# Patient Record
Sex: FEMALE | URBAN - METROPOLITAN AREA
[De-identification: names, ages, dates, MRNs, and addresses within clinical notes are randomized per-mention and may not be internally consistent; named-entity substitution may affect disease eponyms.]

---

## 2022-05-10 ENCOUNTER — OFFICE VISIT (OUTPATIENT)
Dept: URBAN - METROPOLITAN AREA CLINIC 5 | Facility: CLINIC | Age: 42
End: 2022-05-10
Payer: COMMERCIAL

## 2022-05-10 DIAGNOSIS — H25.043 POSTERIOR SUBCAPSULAR POLAR AGE-RELATED CATARACT, BILATERAL: Primary | ICD-10-CM

## 2022-05-10 DIAGNOSIS — E11.3553 DIABETES MELLITUS TYPE 2 WITH STABLE PROLIFERATIVE RETINOPATHY, BILATERAL: ICD-10-CM

## 2022-05-10 DIAGNOSIS — H33.053 TOTAL RETINAL DETACHMENT, BILATERAL: ICD-10-CM

## 2022-05-10 PROCEDURE — 99204 OFFICE O/P NEW MOD 45 MIN: CPT | Performed by: OPTOMETRIST

## 2022-05-10 ASSESSMENT — INTRAOCULAR PRESSURE
OD: 25
OS: 24

## 2022-05-10 NOTE — IMPRESSION/PLAN
Impression: Total retinal detachment, bilateral: H33.053. Plan: Hx of RD repair with Dr Morales Pereira. Silicone oil OS. Referral to [facility] for possible treatment and clearance for CE.

## 2022-05-10 NOTE — IMPRESSION/PLAN
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 Plan: Cataracts both eyes (H25.813) - plan to perform cataract surgery in both eyes with the right eye first: Discussed the risks, benefits, and alternatives of cataract surgery. Given that we almost never use retrobulbar anesthesia, there is typically no need to stop any anticoagulant medications when a patient gets cataract surgery with us. In most cataract surgeries performed by us we place an antibiotic and steroid at the time of surgery so most likely will not need to use any prescription post-op drops. The patient stated a full understanding and a desire to proceed with the procedure. Biometry ordered for intraocular lens selection. Advised against driving until complete. Reviewed the increased risk of retinal detachment after cataract surgery and reviewed retinal detachment and general warnings and to contact us immediately should any of those develop. HOLD UNTIL CLEARED BY RETINA SPECIALIST. Silicone oil OS. Surgical recommendations: No contraindications for toric.

## 2022-05-10 NOTE — IMPRESSION/PLAN
Impression: Diabetes mellitus Type 2 with stable proliferative retinopathy, bilateral: W01.9451. Plan: Hx of laser with Dr Kathie Frausto both eyes. Limited view today. Will refer patient for a retinal consult to determine if treatment is necessary. Discussed ocular and systemic benefits of maintaining blood sugar control.

## 2022-07-13 ENCOUNTER — TESTING ONLY (OUTPATIENT)
Dept: URBAN - METROPOLITAN AREA CLINIC 6 | Facility: CLINIC | Age: 42
End: 2022-07-13
Payer: COMMERCIAL

## 2022-07-13 DIAGNOSIS — H25.043 POSTERIOR SUBCAPSULAR POLAR AGE-RELATED CATARACT, BILATERAL: Primary | ICD-10-CM

## 2022-07-25 ENCOUNTER — Encounter (OUTPATIENT)
Dept: URBAN - METROPOLITAN AREA SURGERY 2 | Facility: SURGERY | Age: 42
End: 2022-07-25
Payer: COMMERCIAL

## 2022-07-25 PROCEDURE — 66984 XCAPSL CTRC RMVL W/O ECP: CPT | Performed by: OPHTHALMOLOGY

## 2022-07-26 ENCOUNTER — PROCEDURE (OUTPATIENT)
Dept: URBAN - METROPOLITAN AREA SURGERY 1 | Facility: SURGERY | Age: 42
End: 2022-07-26
Payer: COMMERCIAL

## 2022-07-26 DIAGNOSIS — H25.813 COMBINED FORMS OF AGE-RELATED CATARACT, BILATERAL: Primary | ICD-10-CM

## 2022-07-27 ENCOUNTER — POST-OPERATIVE VISIT (OUTPATIENT)
Dept: URBAN - METROPOLITAN AREA CLINIC 6 | Facility: CLINIC | Age: 42
End: 2022-07-27
Payer: COMMERCIAL

## 2022-07-27 DIAGNOSIS — Z48.810 ENCOUNTER FOR SURGICAL AFTERCARE FOLLOWING SURGERY ON A SENSE ORGAN: Primary | ICD-10-CM

## 2022-07-27 PROCEDURE — 99024 POSTOP FOLLOW-UP VISIT: CPT | Performed by: OPTOMETRIST

## 2022-07-27 ASSESSMENT — INTRAOCULAR PRESSURE: OD: 20

## 2022-07-27 NOTE — IMPRESSION/PLAN
Impression: S/P Cataract Extraction by phacoemulsification with IOL placement OD - 1 Day. Encounter for surgical aftercare following surgery on a sense organ  Z48.810. Plan: S/P Cataract extraction right eye with placement of intraocular Dexycu and moxifloxacin intracamerally - post-op day #1 - Small abrasion at superior incision- Continue Erythromycin QID OD. Advised no heavy lifting or strenuous activity for at least one week and no swimming for at least three weeks. Use eye shield at night for one week. Patient advised to call immediately for any problems including, but not limited to, pain, redness, increase in floaters, flashing lights, changes in peripheral vision, decreased vision, and/or any other problems or concerns. Patient stated an understanding of all the instructions. --Advised patient to use artificial tears for comfort.

## 2022-08-08 ENCOUNTER — Encounter (OUTPATIENT)
Dept: URBAN - METROPOLITAN AREA SURGERY 2 | Facility: SURGERY | Age: 42
End: 2022-08-08
Payer: COMMERCIAL

## 2022-08-08 PROCEDURE — 66984 XCAPSL CTRC RMVL W/O ECP: CPT | Performed by: OPHTHALMOLOGY

## 2022-08-09 ENCOUNTER — PROCEDURE (OUTPATIENT)
Dept: URBAN - METROPOLITAN AREA SURGERY 1 | Facility: SURGERY | Age: 42
End: 2022-08-09
Payer: COMMERCIAL

## 2022-08-10 ENCOUNTER — POST-OPERATIVE VISIT (OUTPATIENT)
Dept: URBAN - METROPOLITAN AREA CLINIC 6 | Facility: CLINIC | Age: 42
End: 2022-08-10
Payer: COMMERCIAL

## 2022-08-10 DIAGNOSIS — Z96.1 PRESENCE OF INTRAOCULAR LENS: Primary | ICD-10-CM

## 2022-08-10 PROCEDURE — 99024 POSTOP FOLLOW-UP VISIT: CPT | Performed by: OPTOMETRIST

## 2022-08-10 ASSESSMENT — INTRAOCULAR PRESSURE
OD: 21
OS: 15

## 2022-08-10 NOTE — IMPRESSION/PLAN
Impression: S/P Cataract Extraction by phacoemulsification with IOL placement OS - 1 Day. Presence of intraocular lens  Z96.1. Plan: S/P Cataract extraction left eye - post-op day #1 - Use Moxifloxacin and Prednisolone QID OD. Advised no heavy lifting or strenuous activity for one week. Advised no swimming for three weeks. Use eye shield at night for the first week. Patient advised to call immediately for any problems including, but not limited to, pain, redness, and decreased vision. Advised to bring eye drops to each visit. Patient stated an understanding of all the instructions.

## 2022-08-18 ENCOUNTER — POST-OPERATIVE VISIT (OUTPATIENT)
Dept: URBAN - METROPOLITAN AREA CLINIC 6 | Facility: CLINIC | Age: 42
End: 2022-08-18
Payer: COMMERCIAL

## 2022-08-18 DIAGNOSIS — Z96.1 PRESENCE OF INTRAOCULAR LENS: Primary | ICD-10-CM

## 2022-08-18 PROCEDURE — 99024 POSTOP FOLLOW-UP VISIT: CPT | Performed by: OPTOMETRIST

## 2022-08-18 ASSESSMENT — INTRAOCULAR PRESSURE
OD: 24
OS: 18

## 2022-08-18 NOTE — IMPRESSION/PLAN
Impression: S/P Cataract Extraction by phacoemulsification with IOL placement OD - 23 Days. Presence of intraocular lens  Z96.1. Plan: NOTE: Next appt with Dr Mary Orantes 9/15/22 Healing well. Start taper for prednisolone OS TID for one week, BID for one week, QD for one week, then stop. Discontinue Moxifloxacin. Advised patient to avoid swimming for at least 2 more weeks. Advised to call immediately for any problems or concerns including, but not limited to, pain, redness, or decreased vision. The patient stated an understanding of the above. 

Referral to [facility] for ASAP eval for retina - see attached referral note

## 2022-11-22 ENCOUNTER — OFFICE VISIT (OUTPATIENT)
Dept: URBAN - METROPOLITAN AREA CLINIC 6 | Facility: CLINIC | Age: 42
End: 2022-11-22
Payer: COMMERCIAL

## 2022-11-22 DIAGNOSIS — H26.493 OTHER SECONDARY CATARACT, BILATERAL: ICD-10-CM

## 2022-11-22 DIAGNOSIS — H35.371 PUCKERING OF MACULA, RIGHT EYE: ICD-10-CM

## 2022-11-22 DIAGNOSIS — E11.3553 DIABETES MELLITUS TYPE 2 WITH STABLE PROLIFERATIVE RETINOPATHY, BILATERAL: Primary | ICD-10-CM

## 2022-11-22 DIAGNOSIS — H31.012 MACULA SCARS OF POSTERIOR POLE (POST TRAUMATIC), LEFT EYE: ICD-10-CM

## 2022-11-22 PROCEDURE — 92014 COMPRE OPH EXAM EST PT 1/>: CPT | Performed by: OPTOMETRIST

## 2022-11-22 ASSESSMENT — INTRAOCULAR PRESSURE
OS: 23
OD: 24

## 2022-11-22 NOTE — IMPRESSION/PLAN
Impression: Puckering of macula, right eye: H35.371. Plan: Epiretinal membrane right eye - monitor with Dr. Eva Watts.

## 2022-11-22 NOTE — IMPRESSION/PLAN
Impression: Diabetes mellitus Type 2 with stable proliferative retinopathy, bilateral: S10.3187. Plan: Diabetes Mellitus Type II with Proliferative Diabetic Retinopathy both eyes /VH OS/silicon oil OS-under the care of Dr. Monika Puentes. Advised to sleep with head elevation. Discussed the pathophysiology of diabetes and its effect on the eye. Stressed the importance of strong glucose control. Advised of importance of scheduled dilated examinations, and to contact us immediately for any problems or concerns.

## 2022-11-22 NOTE — IMPRESSION/PLAN
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 Plan: Macular Scar - Monitor.

## 2024-08-12 NOTE — IMPRESSION/PLAN
"Subjective:      Patient ID: Natacha Bacon is a 65 y.o. female.    Vitals:  height is 5' 4" (1.626 m) and weight is 69.4 kg (153 lb). Her oral temperature is 99 °F (37.2 °C). Her blood pressure is 117/79 and her pulse is 72. Her respiration is 16 and oxygen saturation is 96%.     Chief Complaint: Rash    Pt presents today with a rash after being treated for poison ivy two weeks ago. Pt states the antibiotics helped remove the blisters however now she is breaking out all over her body.     Rash  This is a new problem. The current episode started 1 to 4 weeks ago. The problem has been gradually worsening since onset. The rash is diffuse. The rash is characterized by itchiness and redness. She was exposed to nothing. Pertinent negatives include no anorexia, congestion, cough, diarrhea, eye pain, facial edema, fatigue, fever, joint pain, nail changes, rhinorrhea, shortness of breath, sore throat or vomiting. Past treatments include antibiotics. The treatment provided mild relief. Her past medical history is significant for varicella. There is no history of allergies, asthma or eczema.       Constitution: Negative for fatigue and fever.   HENT:  Negative for congestion and sore throat.    Eyes:  Negative for eye pain.   Respiratory:  Negative for cough and shortness of breath.    Gastrointestinal:  Negative for vomiting and diarrhea.   Skin:  Positive for rash. Negative for erythema.      Objective:     Physical Exam   Constitutional: She is oriented to person, place, and time. She appears well-developed.   HENT:   Head: Normocephalic and atraumatic. Head is without abrasion, without contusion and without laceration.   Ears:   Right Ear: External ear normal.   Left Ear: External ear normal.   Nose: Nose normal.   Mouth/Throat: Oropharynx is clear and moist and mucous membranes are normal.   Eyes: Conjunctivae, EOM and lids are normal. Pupils are equal, round, and reactive to light.   Neck: Trachea normal and " Impression: Other secondary cataract, bilateral: H26.493. Plan: Posterior capsular opacity both eyes (common clouding behind the intraocular lens implant) - Will hold off on intervention until cleared by Dr. Waylon Rosario. Patient advised to contact us for any decrease vision, glare, or other visual problems. phonation normal. Neck supple.   Cardiovascular: Normal rate, regular rhythm and normal heart sounds.   Pulmonary/Chest: Effort normal and breath sounds normal. No stridor. No respiratory distress.   Musculoskeletal: Normal range of motion.         General: Normal range of motion.   Neurological: She is alert and oriented to person, place, and time.   Skin: Skin is warm, dry, intact and rash. Capillary refill takes less than 2 seconds. No abrasion, No burn, No bruising, No erythema and No ecchymosis         Comments: a painful, grouped pustules and/or vesicles on an erythematous base some are blistering,             Psychiatric: Her speech is normal and behavior is normal. Judgment and thought content normal.   Nursing note and vitals reviewed.      Assessment:     1. Herpes zoster without complication    2. Psychophysiological insomnia        Plan:       Herpes zoster without complication  -     hydrOXYzine HCL (ATARAX) 25 MG tablet; Take 2 tablets (50 mg total) by mouth nightly as needed for Itching (insomnia).  Dispense: 30 tablet; Refill: 0    Psychophysiological insomnia  -     hydrOXYzine HCL (ATARAX) 25 MG tablet; Take 2 tablets (50 mg total) by mouth nightly as needed for Itching (insomnia).  Dispense: 30 tablet; Refill: 0          Medical Decision Making:   Initial Assessment:   Poison ivy dermatitis had healed and then the shingles rahs started and has progressed. Itching is the major symptom   Urgent Care Management:  Hydroxyzine to help with itching from shingles and also to help sleep as she has been not sleeping well